# Patient Record
Sex: MALE | URBAN - METROPOLITAN AREA
[De-identification: names, ages, dates, MRNs, and addresses within clinical notes are randomized per-mention and may not be internally consistent; named-entity substitution may affect disease eponyms.]

---

## 2023-02-28 NOTE — PROGRESS NOTES
[facility] Dermatology Note  Encounter Date: Mar 3, 2023  Office Visit     Dermatology Problem List:  1. Acne vulgaris  - current: BPO and doxycycline  - future: isotretinoin    # Soc hx: freshman at , studying electrical engineering.  ____________________________________________    Assessment & Plan:  # Acne vulgaris  Several year history of acne primarily affecting the  back and upper chest, with few acneiform lesions on the face.  Etiology natural history of acne vulgaris was discussed with the patient as well as treatment options and the rationale for use.  We will initiate benzyl peroxide wash to use daily as well as doxycycline 100 mg twice daily for the next 3 months to help treat current acne present.  Side effects of doxycycline discussed including GI upset and photosensitivity, among others.  Discussed that doxycycline is not a long-term option and that acne may recur after discontinuation.  Therefore, we discussed the role of isotretinoin given his scarring and postinflammatory changes which we will plan to initiate at our follow-up in 3 months.  - Start benzoyl peroxide 4-5% wash daily in shower. Be sure to rinse thoroughly with use as it can bleach towels and clothing.  - Start doxycycline 100mg BID  - Plan for isotretinoin next visit    Procedures Performed:   None    Follow-up: 3 months, patient will be at home in [place] as semester ends, will plan for in-person so that can enroll in ipledge and get baseline labs    Staff and Scribe and Resident    Scribe Disclosure:  I, GUANAKO MCKEON, am serving as a scribe to document services personally performed by Juliana Diaz MD based on data collection and the provider's statements to me.     Nate Arenas MD  PGY-3 Dermatology  Pager: [phone]    Staff Physician Comments:   I saw and evaluated the patient with the resident and I agree with the assessment and plan.  I was present for the examination.    Juliana Diaz MD  Assistant  Professor  Department of Dermatology  [facility]: Phone: [phone], Fax: [phone]  3/6/2023     ____________________________________________    CC: Derm Problem (Acne - no using anything to treat)    HPI:  Mr. Neha St is a(n) 17 year old male who presents today as a new patient for acne.    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  SKIN: Acne exam, which includes the face, neck, upper central chest, and upper central back was performed.  - Few acneiform papules on the face  - Scattered acneiform papules on the upper chest  - Significant acneiform papules scattered throughout the back admixed post inflammatory hyperpigmentation and scarring  - No other lesions of concern on areas examined.           Medications:  Current Outpatient Medications   Medication     benzoyl peroxide 5 % external liquid     doxycycline monohydrate (MONODOX) 100 MG capsule     No current facility-administered medications for this visit.      Past Medical History:   There is no problem list on file for this patient.    No past medical history on file.     CC Referred Self, MD  No address on file on close of this encounter.

## 2023-03-03 ENCOUNTER — OFFICE VISIT (OUTPATIENT)
Dept: DERMATOLOGY | Facility: CLINIC | Age: 18
End: 2023-03-03
Payer: COMMERCIAL

## 2023-03-03 DIAGNOSIS — L70.0 ACNE VULGARIS: Primary | ICD-10-CM

## 2023-03-03 PROCEDURE — 99204 OFFICE O/P NEW MOD 45 MIN: CPT | Mod: GC | Performed by: DERMATOLOGY

## 2023-03-03 RX ORDER — DOXYCYCLINE 100 MG/1
100 CAPSULE ORAL 2 TIMES DAILY
Qty: 180 CAPSULE | Refills: 0 | Status: SHIPPED | OUTPATIENT
Start: 2023-03-03

## 2023-03-03 NOTE — NURSING NOTE
Dermatology Rooming Note    Neha St's goals for this visit include:   Chief Complaint   Patient presents with     Derm Problem     Acne - no using anything to treat     Kassandra Sarmiento, CMA

## 2023-03-03 NOTE — LETTER
3/3/2023       RE: Neha St  [address]  [facility] [address]     Dear Colleague,    Thank you for referring your patient, Neha St, to the [facility] [place] [facility]. Please see a copy of my visit note below.    [facility] Dermatology Note  Encounter Date: Mar 3, 2023  Office Visit     Dermatology Problem List:  1. Acne vulgaris  - current: BPO and doxycycline  - future: isotretinoin    # Soc hx: freshman at , studying electrical engineering.  ____________________________________________    Assessment & Plan:  # Acne vulgaris  Several year history of acne primarily affecting the  back and upper chest, with few acneiform lesions on the face.  Etiology natural history of acne vulgaris was discussed with the patient as well as treatment options and the rationale for use.  We will initiate benzyl peroxide wash to use daily as well as doxycycline 100 mg twice daily for the next 3 months to help treat current acne present.  Side effects of doxycycline discussed including GI upset and photosensitivity, among others.  Discussed that doxycycline is not a long-term option and that acne may recur after discontinuation.  Therefore, we discussed the role of isotretinoin given his scarring and postinflammatory changes which we will plan to initiate at our follow-up in 3 months.  - Start benzoyl peroxide 4-5% wash daily in shower. Be sure to rinse thoroughly with use as it can bleach towels and clothing.  - Start doxycycline 100mg BID  - Plan for isotretinoin next visit    Procedures Performed:   None    Follow-up: 3 months, patient will be at home in [place] as semester ends, will plan for in-person so that can enroll in ipledge and get baseline labs    Staff and Scribe and Resident    Scribe Disclosure:  I, GUANAKO MCKEON, am serving as a scribe to document services personally performed by Juliana Diaz,  MD based on data collection and the provider's statements to me.     Nate Arenas MD  PGY-3 Dermatology  Pager: [phone]    Staff Physician Comments:   I saw and evaluated the patient with the resident and I agree with the assessment and plan.  I was present for the examination.    Juliana Diaz MD    Department of Dermatology  [facility]: Phone: [phone], Fax: [phone]  3/6/2023     ____________________________________________    CC: Derm Problem (Acne - no using anything to treat)    HPI:  Mr. Neha St is a(n) 17 year old male who presents today as a new patient for acne.    - acne on back  - today is average day  - currently using nothing  - previously used something from peru in [place], was an oral medication  - bothers him 4-/510  - active gym goer, denies steroid use  - no family hx of back acne    SH  - Freshman at [employer]      Labs Reviewed:  N/A    Physical Exam:  Vitals: There were no vitals taken for this visit.  SKIN: Acne exam, which includes the face, neck, upper central chest, and upper central back was performed.  - Few acneiform papules on the face  - Scattered acneiform papules on the upper chest  - Significant acneiform papules scattered throughout the back admixed post inflammatory hyperpigmentation and scarring  - No other lesions of concern on areas examined.           Medications:  Current Outpatient Medications   Medication     benzoyl peroxide 5 % external liquid     doxycycline monohydrate (MONODOX) 100 MG capsule     No current facility-administered medications for this visit.      Past Medical History:   There is no problem list on file for this patient.    No past medical history on file.     CC Referred Self, MD  No address on file on close of this encounter.      Again, thank you for allowing me to participate in the care of your patient.       Sincerely,    Juliana Diaz MD

## 2023-03-03 NOTE — PATIENT INSTRUCTIONS
- Start benzoyl peroxide wash daily in shower (Panoxyl)  - Start doxycycline 100mg twice daily

## 2023-03-03 NOTE — LETTER
Date:March 7, 2023      Patient was self referred, no letter generated. Do not send.        [facility] Health Information

## 2023-03-13 ENCOUNTER — PATIENT OUTREACH (OUTPATIENT)
Dept: DERMATOLOGY | Facility: CLINIC | Age: 18
End: 2023-03-13
Payer: COMMERCIAL

## 2023-03-13 NOTE — LETTER
March 13, 2023    Neha St  [address] [facility] [address]        Dear Neha St:    We recently reviewed your medical records from [facility] and found that you are due for an appointment with Dr. Juliana Diaz in June for an in-person visit.  Our office has attempted to reach you via telephone and left a message.    At your earliest convenience, please call the clinic at [phone] to arrange the recommended exam and possible testing.  Please kindly mention this letter when calling so that your appointment(s) can be accurately scheduled.      Sincerely,       The Clinic Staff        Medical Record Number:  [id number]

## 2023-03-13 NOTE — TELEPHONE ENCOUNTER
Attempted to reach patient to schedule follow up in the Dermatology Clinic.  No answer,  LM on VM to call office and Letter mailed..    Schedule with Dr. Juliana Diaz 6/2023- in person.

## 2023-03-20 ENCOUNTER — PATIENT OUTREACH (OUTPATIENT)
Dept: DERMATOLOGY | Facility: CLINIC | Age: 18
End: 2023-03-20
Payer: COMMERCIAL

## 2023-03-20 NOTE — LETTER
March 20, 2023          Nhea [address]  [address] [facility] [address]        Dear Neha St:    We recently reviewed your medical records from [facility] and found that you are due for an appointment with Dr. Juliana Diaz.  Our office has attempted to reach you via telephone and left a message.    At your earliest convenience, please call the clinic at [phone] to arrange the recommended exam and possible testing.  Please kindly mention this letter when calling so that your appointment(s) can be accurately scheduled.      Sincerely,       The Clinic Staff        Medical Record Number:  [id number]

## 2023-03-20 NOTE — TELEPHONE ENCOUNTER
Attempted to reach patient to schedule follow up in the Dermatology Clinic.  No answer,  LM on VM to call office and Letter mailed..    Schedule with Dr. Juliana Diaz 6/2023.